# Patient Record
Sex: FEMALE | Race: WHITE
[De-identification: names, ages, dates, MRNs, and addresses within clinical notes are randomized per-mention and may not be internally consistent; named-entity substitution may affect disease eponyms.]

---

## 2021-02-03 ENCOUNTER — HOSPITAL ENCOUNTER (OUTPATIENT)
Dept: HOSPITAL 53 - M PLAIMG | Age: 21
End: 2021-02-03
Attending: NURSE PRACTITIONER
Payer: COMMERCIAL

## 2021-02-03 DIAGNOSIS — R09.89: Primary | ICD-10-CM

## 2021-02-03 NOTE — REPPI
INDICATION:

R09.89 CHEST CONGESTION.



COMPARISON:

No comparison chest x-ray.



TECHNIQUE:

Three views presented...



FINDINGS:

The lungs are well inflated and free of infiltrate.  The pleural angles are sharp.

The heart size is normal.  Pulmonary vasculature is not increased.  No significant

bony abnormality is seen.



IMPRESSION:

Negative chest x-ray.





<Electronically signed by Adi Baca > 02/03/21 [id number]

## 2021-02-05 ENCOUNTER — HOSPITAL ENCOUNTER (OUTPATIENT)
Dept: HOSPITAL 53 - M SFHCWAGY | Age: 21
End: 2021-02-05
Attending: NURSE PRACTITIONER
Payer: COMMERCIAL

## 2021-02-05 DIAGNOSIS — Z11.3: Primary | ICD-10-CM

## 2021-02-05 LAB
CHLAMYDIA DNA AMPLIFICATION: (no result)
N GONORRHOEA RRNA SPEC QL NAA+PROBE: (no result)

## 2021-02-05 PROCEDURE — 87624 HPV HI-RISK TYP POOLED RSLT: CPT

## 2021-02-05 PROCEDURE — 87661 TRICHOMONAS VAGINALIS AMPLIF: CPT

## 2021-02-10 ENCOUNTER — HOSPITAL ENCOUNTER (OUTPATIENT)
Dept: HOSPITAL 53 - M PLALAB | Age: 21
End: 2021-02-10
Attending: NURSE PRACTITIONER
Payer: COMMERCIAL

## 2021-02-10 DIAGNOSIS — J30.2: Primary | ICD-10-CM

## 2021-02-10 DIAGNOSIS — H10.45: ICD-10-CM

## 2021-02-10 LAB
C3 SERPL-MCNC: 116 MG/DL (ref 90–180)
C4 SERPL-MCNC: 22 MG/DL (ref 10–40)
IGA SERPL-MCNC: 144 MG/DL (ref 70–400)
IGE SERPL-ACNC: 762 IU/ML (ref ?–100)
IGG SERPL-MCNC: 971 MG/DL (ref 681–1648)
IGM SERPL-MCNC: 188 MG/DL (ref 40–230)

## 2021-02-26 ENCOUNTER — HOSPITAL ENCOUNTER (EMERGENCY)
Dept: HOSPITAL 53 - M ED | Age: 21
Discharge: HOME | End: 2021-02-26
Payer: COMMERCIAL

## 2021-02-26 VITALS — BODY MASS INDEX: 29.31 KG/M2 | HEIGHT: 65 IN | WEIGHT: 175.93 LBS

## 2021-02-26 VITALS — DIASTOLIC BLOOD PRESSURE: 87 MMHG | SYSTOLIC BLOOD PRESSURE: 128 MMHG

## 2021-02-26 DIAGNOSIS — O99.011: ICD-10-CM

## 2021-02-26 DIAGNOSIS — O34.61: Primary | ICD-10-CM

## 2021-02-26 DIAGNOSIS — O99.331: ICD-10-CM

## 2021-02-26 DIAGNOSIS — O03.9: ICD-10-CM

## 2021-02-26 DIAGNOSIS — Z3A.01: ICD-10-CM

## 2021-02-26 LAB
ALBUMIN SERPL BCG-MCNC: 3.9 GM/DL (ref 3.2–5.2)
ALT SERPL W P-5'-P-CCNC: 20 U/L (ref 12–78)
APTT BLD: 24.1 SECONDS (ref 24.2–38.5)
B-HCG SERPL-ACNC: (no result) MIU/ML
BASOPHILS # BLD AUTO: 0.1 10^3/UL (ref 0–0.2)
BASOPHILS NFR BLD AUTO: 0.5 % (ref 0–1)
BILIRUB CONJ SERPL-MCNC: 0.2 MG/DL (ref 0–0.2)
BILIRUB SERPL-MCNC: 0.7 MG/DL (ref 0.2–1)
BUN SERPL-MCNC: 6 MG/DL (ref 7–18)
CALCIUM SERPL-MCNC: 9.2 MG/DL (ref 8.5–10.1)
CHLORIDE SERPL-SCNC: 104 MEQ/L (ref 98–107)
CO2 SERPL-SCNC: 24 MEQ/L (ref 21–32)
CREAT SERPL-MCNC: 0.91 MG/DL (ref 0.55–1.3)
EOSINOPHIL # BLD AUTO: 0.4 10^3/UL (ref 0–0.5)
EOSINOPHIL NFR BLD AUTO: 3.1 % (ref 0–3)
GFR SERPL CREATININE-BSD FRML MDRD: > 60 ML/MIN/{1.73_M2} (ref 60–?)
GLUCOSE SERPL-MCNC: 103 MG/DL (ref 70–100)
HCT VFR BLD AUTO: 30.8 % (ref 36–47)
HCT VFR BLD AUTO: 37.7 % (ref 36–47)
HGB BLD-MCNC: 10.6 G/DL (ref 12–15.5)
HGB BLD-MCNC: 12.7 G/DL (ref 12–15.5)
INR PPP: 1.02
LIPASE SERPL-CCNC: 82 U/L (ref 73–393)
LYMPHOCYTES # BLD AUTO: 3.1 10^3/UL (ref 1.5–5)
LYMPHOCYTES NFR BLD AUTO: 27.2 % (ref 24–44)
MCH RBC QN AUTO: 30.8 PG (ref 27–33)
MCH RBC QN AUTO: 32.1 PG (ref 27–33)
MCHC RBC AUTO-ENTMCNC: 33.7 G/DL (ref 32–36.5)
MCHC RBC AUTO-ENTMCNC: 34.4 G/DL (ref 32–36.5)
MCV RBC AUTO: 91.5 FL (ref 80–96)
MCV RBC AUTO: 93.3 FL (ref 80–96)
MONOCYTES # BLD AUTO: 0.9 10^3/UL (ref 0–0.8)
MONOCYTES NFR BLD AUTO: 7.4 % (ref 2–8)
NEUTROPHILS # BLD AUTO: 7.1 10^3/UL (ref 1.5–8.5)
NEUTROPHILS NFR BLD AUTO: 61.5 % (ref 36–66)
PLATELET # BLD AUTO: 220 10^3/UL (ref 150–450)
PLATELET # BLD AUTO: 310 10^3/UL (ref 150–450)
POTASSIUM SERPL-SCNC: 3.8 MEQ/L (ref 3.5–5.1)
PROT SERPL-MCNC: 7 GM/DL (ref 6.4–8.2)
PROTHROMBIN TIME: 13.6 SECONDS (ref 12.5–14.3)
RBC # BLD AUTO: 3.3 10^6/UL (ref 4–5.4)
RBC # BLD AUTO: 4.12 10^6/UL (ref 4–5.4)
SODIUM SERPL-SCNC: 137 MEQ/L (ref 136–145)
WBC # BLD AUTO: 11.5 10^3/UL (ref 4–10)
WBC # BLD AUTO: 8.5 10^3/UL (ref 4–10)

## 2021-02-26 NOTE — CCD
Summarization Of Episode

                             Created on: 2021



FLORIDA ROTHMAN

External Reference #: [id number]

: 2000

Sex: Female



Demographics





                          Address                   [address] [place]  [address]

 

                          Home Phone                [phone]

 

                          Preferred Language        English

 

                          Marital Status            

 

                          Roman Catholic Affiliation     NONE

 

                          Race                      White

 

                          Ethnic Group              Not  or 





Author





                          Author                    [facility]

 

                          Organization              [facility]

 

                          Address                   Unknown

 

                          Phone                     Unavailable







Support





                Name            Relationship    Address         Phone

 

                    JOSSUE ROTHMAN  Next Of Kin         [address] [place]  [address]               [phone]

 

                    MILAGROS PELLETIER     Next Of Kin         [address] [place] [address]                     [phone]

 

                    GUILFOYLE           Next Of Kin         [address] [place] [address]                    [phone]

 

                    MILAGROS WHITNEY     Next Of Kin         [address] [place] [address]                     [phone]

 

                    [employer]      Next Of Kin         [address] [place] [address]                    [phone]

 

                UE              Next Of Kin     Unknown         Unavailable

 

                    TEODORO DEL VALLE    Next Of Kin         [address] [place] [address]                    [phone]

 

                    jossue rothman  ECON                [address] [place]  [address]               Unavailable

 

                    MILAGROS WHITNEY     ECON                [address] [address]  [address]                     Unavailable







Care Team Providers





                    Care Team Member Name Role                Phone

 

                    JOHAN CATES    Unavailable         Unavailable

 

                    JOAHN CATES    Unavailable         Unavailable

 

                    DESJARLAIS,  RANJAN NP Unavailable         Unavailable

 

                    DESJARLAIS,  RANJAN NP Unavailable         Unavailable

 

                    DESJARLAIS,  RANJAN NP Unavailable         Unavailable

 

                    DESJARLAIS,  RANJAN NP Unavailable         Unavailable

 

                    DESJARLAIS,  RANJAN NP Unavailable         Unavailable

 

                    DESJARLAIS,  RANJAN NP Unavailable         Unavailable

 

                    DESJARLAIS,  RANJAN NP Unavailable         Unavailable

 

                    DESJARLAIS,  RANJAN NP Unavailable         Unavailable

 

                    DESJARLAIS,  RANJAN NP Unavailable         Unavailable

 

                    LETTIERE, A SAMANTHA PA Unavailable         Unavailable

 

                    LETTIERE, A SAMANTHA PA Unavailable         Unavailable

 

                    LETTIERE, A SAMANTHA PA Unavailable         Unavailable

 

                    LETTIERE, A SAMANTHA PA Unavailable         Unavailable

 

                    LETTIERE, A SAMANTHA PA Unavailable         Unavailable

 

                    LETTIERE, A SAMANTHA PA Unavailable         Unavailable

 

                    LETTIERE, A SAMANTHA PA Unavailable         Unavailable

 

                    LETTIERE, A SAMANTHA PA Unavailable         Unavailable

 

                    LETTIERE, A SAMANTHA PA Unavailable         Unavailable

 

                    LETTIERE, A SAMANTHA PA Unavailable         Unavailable

 

                    LETTIERE, A SAMANTHA PA Unavailable         Unavailable

 

                    LETTIERE, A SAMANTHA PA Unavailable         Unavailable

 

                    LETTIERE, A SAMANTHA PA Unavailable         Unavailable

 

                    LETTIERE, A SAMANTHA PA Unavailable         Unavailable

 

                    LETTIERE, A SAMANTHA PA Unavailable         Unavailable

 

                    LETTIERE, A SAMANTHA PA Unavailable         Unavailable

 

                    LETTIERE, A SAMANTHA PA Unavailable         Unavailable

 

                    LETTIERE, A SAMANTHA PA Unavailable         Unavailable

 

                    LETTIERE, A SAMANTHA PA Unavailable         Unavailable

 

                    LETTIERE, A SAMANTHA PA Unavailable         Unavailable

 

                    LETTIERE, A SAMANTHA PA Unavailable         Unavailable

 

                    LETTIERE, A SAMANTHA PA Unavailable         Unavailable

 

                    LETTIERE, A SAMANTHA PA Unavailable         Unavailable

 

                    LETTIERE, A SAMANTHA PA Unavailable         Unavailable

 

                    LETTIERE, A SAMANTHA PA Unavailable         Unavailable

 

                    LETTIERE, A SAMANTHA PA Unavailable         Unavailable

 

                    LETTIERE, A SAMANTHA PA Unavailable         Unavailable

 

                    LETTIERE, A SAMANTHA PA Unavailable         Unavailable

 

                    LETTIERE, A SAMANTHA PA Unavailable         Unavailable



                                  



Re-disclosure Warning

          The records that you are about to access may contain information from 
federally-assisted alcohol or drug abuse programs. If such information is 
present, then the following federally mandated warning applies: This information
has been disclosed to you from records protected by federal confidentiality 
rules (42 CFR part 2). The federal rules prohibit you from making any further 
disclosure of this information unless further disclosure is expressly permitted 
by the written consent of the person to whom it pertains or as otherwise 
permitted by 42 CFR part 2. A general authorization for the release of medical 
or other information is NOT sufficient for this purpose. The Federal rules 
restrict any use of the information to criminally investigate or prosecute any 
alcohol or drug abuse patient.The records that you are about to access may 
contain highly sensitive health information, the redisclosure of which is 
protected by Article 27-F of the [facility] Public Health law. If you 
continue you may have access to information: Regarding HIV / AIDS; Provided by 
facilities licensed or operated by the [facility] Office of Mental Health; 
or Provided by the [facility] Office for People With Developmental 
Disabilities. If such information is present, then the following [place] 
mandated warning applies: This information has been disclosed to you from 
confidential records which are protected by state law. State law prohibits you 
from making any further disclosure of this information without the specific 
written consent of the person to whom it pertains, or as otherwise permitted by 
law. Any unauthorized further disclosure in violation of state law may result in
a fine or retirement sentence or both. A general authorization for the release of 
medical or other information is NOT sufficient authorization for further disc
losure.                                                                         
    



Family History

          



             Family Member Name Family Member Gender Family Member Status Date o

f Status 

Description                             Data Source(s)

 

           Unknown    Unknown    Problem                          [facility] ([facility] )



                                                                                
       



Encounters

          



           Encounter  Providers  Location   Date       Indications Data Source(s

)

 

                Outpatient                      [address] [facility]

[place] [id number] 2021 12:00:00 AM

EST                                                 eCW1 ([facility])

 

                Outpatient                      [address] [facility]

[place] [id number] 2021 12:00:00 AM

EST                                                 eCW1 ([facility])

 

                Unknown                         [address] [facility]

[place] [id number] 2021 12:00:00 AM 

EST                                                 eCW1 ([facility])

 

                Outpatient                      [address] [facility] [id number] 2021 12:00:00 AM

EST                                                 eCW1 ([facility])

 

                Outpatient      Attender: SAMANTHA loera 08/10/2020 

09:40:00 AM [facility] ([facility]

, [facility])

 

           Outpatient Attender: RANJAN PADILLA NP            2020 01:

00:00 PM [facility]

 

           Outpatient Attender: RANJAN PADILLA NP            2020 09:

02:00 AM [facility]

 

           Outpatient Attender: JOSSUE CATES            2020 02:00:00 PM 

[facility]

 

           Outpatient Attender: JOSSUE CATES            05/15/2020 09:00:00 AM 

[facility]

 

           Outpatient Attender: RANJAN PADILLA NP            2020 11:

40:00 AM [facility]

 

           Outpatient Attender: JOSSUE CATES            2020 09:01:00 AM 

[facility]

 

           Outpatient Attender: JOSSUE CATES            2020 09:41:00 AM 

[facility]

 

           Outpatient Attender: RANJAN PADILLA NP            2020 11:

40:00 AM [facility]

 

           Outpatient Attender: RANJAN PADILLA NP            2020 11:

39:00 AM [facility]

 

           Outpatient Attender: JOSSUE CATES            2020 12:44:00 PM 

[facility]

 

           Outpatient Attender: JOSSUE CATES            2020 12:43:00 PM 

[facility]

 

           Outpatient Attender: JOSSUE CATES            2020 09:57:00 AM 

[facility]

 

           Outpatient Attender: RANJAN PADILLA NP            2019 02:

31:00 PM [facility]

 

           Outpatient Attender: RANJAN PADILLA NP            2019 09:

06:00 AM [facility]

 

           Outpatient Attender: RANJAN PADILLA NP            2019 12:

47:00 PM [facility]



                                                                                
                                                                                
                                                                                
                                   



Medications

          



          Medication Brand Name Start Date Product Form Dose      Route     Admi

nistrative 

Instructions Pharmacy Instructions Status     Indications Reaction   Description

 Data 

Source(s)

 

        GIAN 3-0.02 MG GIAN 3-0.02 MG 2021 12:00:00 AM EST         1.0 {tabl

et}                         

active                                          GIAN 3-0.02 MG   eCW1 ([facility])

 

        GIAN 3-0.02 MG GIAN 3-0.02 MG 2021 12:00:00 AM EST         1.0 {tabl

et}                         

active                                          GIAN 3-0.02 MG   eCW1 ([facility])

 

                                        Amoxicillin 875 MG / Clavulanate 125 MG 

Oral Tablet Amoxicillin-Pot Clavulanate 

875-125 MG      Amoxicillin-Pot Clavulanate 875-125 MG 2021 12:00:00 AM ES

T                 

1.0 {tablet}                         suspended                 Amoxicillin-Pot C

lavulanate 875-125 MG eCW1 

([facility])

 

                                        Amoxicillin 875 MG / Clavulanate 125 MG 

Oral Tablet Amoxicillin-Pot Clavulanate 

875-125 MG      Amoxicillin-Pot Clavulanate 875-125 MG 2021 12:00:00 AM ES

T                 

1.0 {tablet}                         suspended                 Amoxicillin-Pot C

lavulanate 875-125 MG eCW1 

([facility])

 

                                        Amoxicillin 875 MG / Clavulanate 125 MG 

Oral Tablet Amoxicillin-Pot Clavulanate 

875-125 MG      Amoxicillin-Pot Clavulanate 875-125 MG 2021 12:00:00 AM ES

T                 

1.0 {tablet}                         active                  Amoxicillin-Pot Cla

vulanate 875-125 MG eCW1 

([facility])

 

     Birth Control Pill      08/10/2020 12:00:00 AM EDT                         

 active                [facility] 

([facility])



                                                                                
                                     



Insurance Providers

          



             Payer name   Policy type / Coverage type Policy ID    Covered party

 ID Covered 

party's relationship to macias Policy Macias             Plan Information

 

          [employer] [place] 306/806           [id number]           SP        

          [id number]

 

          SELF PAY ONLY           [id number]           SP                  [id number]

[id number]

 

          [employer] B         [id number]           S                   [id number]

[id number]

 

           [place]           [id number]           SPO             

    [id number]

 

          [employer]  [place]         [id number]           O               

    [id number]

 

           [facility] HUMANA            [id number]           [id number]       

         [id number]

 

             U         [id number]           Self                [id number]

 

            [employer] DOUGLAS O         [id number]           S              

     [id number]

 

            [facility]           [id number]           [id number]         

       [id number]

 

            [facility]           [id number]           [id number]          

       [id number]

 

             [facility] Health Maintenance Organization (HMO) [id number]

[id number]                 

Family Dependent                                    [id number]

 

            [facility]           [id number]           [id number]          

       [id number]

 

          [facility]           [id number]           SP                  [id number]

[id number]



                                                                                
                                                                                
                                              



Problems, Conditions, and Diagnoses

          



           Code       Display Name Description Problem Type Effective Dates Data

 Source(s)

 

           J32.9      Sinusitis  Sinusitis  Problem    2021 12:00:00 AM ES

T eCW1 ([facility])

 

           J30.9      Allergic rhinitis Allergic rhinitis Problem    2021 

12:00:00 AM EST 

eCW1 ([facility])

 

             G47.00       Insomnia, unspecified INSOMNIA, UNSPECIFIED Diagnosis 

   2020 01:00:00

PM [facility]

 

             F60.3        Borderline personality disorder BORDERLINE PERSONALITY

 DISORDER Diagnosis    

2020 01:00:00 PM [facility]

 

             F41.1        Generalized anxiety disorder GENERALIZED ANXIETY DISOR

MERRY Diagnosis    

2020 01:00:00 PM [facility]

 

                F60.9           Personality disorder, unspecified PERSONALITY DI

SORDER, UNSPECIFIED 

Diagnosis                 2020 09:02:00 AM [facility]

 

                    F41.0               Panic disorder [episodic paroxysmal anxi

ety] PANIC DISORDER [EPISODIC 

PAROXYSMAL ANXIETY] Diagnosis           2020 09:02:00 AM [facility]

l

 

                    F33.2               Major depressive disorder, recurrent sev

ere without psychotic features 

MAJOR DEPRESSV DISORDER, RECURRENT SEVERE W/O PSYC Diagnosis                 2020 02:00:00

PM [facility]



                                                                                
                                                                                
      



Surgeries/Procedures

          



             Procedure    Description  Date         Indications  Data Source(s)

 

                    Therapeutic, Prophylactic Or Diagnostic Injection Subq/Im   

                  08/10/2020 12:00:00 

AM EDT                                              [facility] ([facility]



                                                                                
       



Results

          



                    ID                  Date                Data Source

 

                    CBC with Differential 2021 12:00:00 AM EST eCW1 ([facility])









          Name      Value     Range     Interpretation Code Description Data Marialuisa

rce(s) Supporting 

Document(s)

 

                    4.45      4.00-5.40                     eCW1 ([facility])  

 

                    7.6       4.0-10.0                      eCW1 ([facility])  

 

                    13.6      12.0-15.5                     eCW1 ([facility])  

 

                    42.7      36.0-47.0                     eCW1 ([facility])  

 

                    31.9      32.0-36.5                     eCW1 ([facility])  

 

                    96.0      80.0-96.0                     eCW1 ([facility])  

 

                    30.6      27.0-33.0                     eCW1 ([facility])  

 

                    6.7       0.0-5.0                       eCW1 ([facility])  

 

                    293       150-450                       eCW1 ([facility])  

 

                    12.3      11.5-14.5                     eCW1 ([facility])  

 

                    46.7      36.0-66.0                     eCW1 ([facility])  

 

                    33.9      24.0-44.0                     eCW1 ([facility])  

 

                    0.5       0.0-0.8                       eCW1 ([facility])  

 

                    0.5       0.0-1.0                       eCW1 ([facility])  

 

                    3.6       1.5-8.5                       eCW1 ([facility])  

 

                    12.1      0.0-3.0                       eCW1 ([facility])  

 

                    2.6       1.5-5.0                       eCW1 ([facility])  

 

                    0.9       0.0-0.5                       eCW1 ([facility])  

 

                    0.0       0.0-0.2                       eCW1 ([facility])  









                    ID                  Date                Data Source

 

                    Comprehensive Metabolic Profile (CMP) 2021 12:00:00 AM

 EST eCW1 ([facility])









          Name      Value     Range     Interpretation Code Description Data Marialuisa

rce(s) Supporting 

Document(s)

 

                    9         7-18                          eCW1 ([facility])  

 

                    0.74      0.55-1.30                     eCW1 ([facility])  

 

                    80                                eCW1 ([facility])  

 

                    139       136-145                       eCW1 ([facility])  

 

                    106                               eCW1 ([facility])  

 

                    > 60.0    >60                           eCW1 ([facility])  

 

                    4.5       3.5-5.1                       eCW1 ([facility])  

 

                    29        21-32                         eCW1 ([facility])  

 

                    27        12-78                         eCW1 ([facility])  

 

                    74                                eCW1 ([facility])  

 

                    9.0       8.5-10.1                      eCW1 ([facility])  

 

                    0.4       0.2-1.0                       eCW1 ([facility])  

 

                    23        7-37                          eCW1 ([facility])  

 

                    7.0       6.4-8.2                       eCW1 ([facility])  

 

                    3.7       3.2-5.2                       eCW1 ([facility])  

 

                    1.1       1.2-2.2                       eCW1 ([facility])  









                    ID                  Date                Data Source

 

                    FREE T4 & TSH PANEL 2021 12:00:00 AM EST eCW1 ([facility])









          Name      Value     Range     Interpretation Code Description Data Marialuisa

rce(s) Supporting 

Document(s)

 

                    1.480     0.358-3.740                     eCW1 ([facility])  

 

                    0.99      0.76-1.46                     eCW1 ([facility])  









                    ID                  Date                Data Source

 

                    LIPID PANEL (CARDIAC RISK) 2021 12:00:00 AM EST eCW1 (

[facility])









          Name      Value     Range     Interpretation Code Description Data Marialuisa

rce(s) Supporting 

Document(s)

 

           Cholesterol in HDL [Moles/volume] in Serum or Plasma 64         >40  

                            eCW1 ([facility])                    

 

           Triglyceride [Mass/volume] in Serum or Plasma by calculation 143     

   <150                             eCW1 

([facility])         

 

           Cholesterol [Moles/volume] in Serum or Plasma 227        <200        

                     eCW1 ([facility])                    

 

                    3.546     <5                            eCW1 ([facility])  

 

           Cholesterol in LDL [Mass/volume] in Serum or Plasma by calculation 13

4        <100                             

eCW1 ([facility])    

 

                    163                                     eCW1 ([facility])  









                    ID                  Date                Data Source

 

                    [id number]          08/10/2020 12:00:00 AM EDT [facility]

 

                                        

________________________________________________________________________________

NEHEMIAH Albrecht         Patient Name: ABRAM KANGY45Leighton Marino [employer]   
              [facility] 2000[facility]  [address]              Date of
 Exam: 08/10/2020PH#: [phone]: 
[phone]______________________________________________________________________

__________EXAM: LUMBSACRAL SPINE (2 OR 3 VIEWS) XRAYCLINICAL INFORMATION:  Back 
pain.Five views.Multiple views of the lumbosacral spine show no acute 
fracture,dislocation, or subluxation.  The intervertebral disc spaces are 
symmetricand well maintained.  There is no spondylolysis or spondylolisthesis.  
Thepedicles are intact bilaterally and there is no destructive osseous lesion. 
IMPRESSION:Essentially unremarkable lumbosacral spine series.PJ Ayoub/Pratik you for referring FLORIDA KANG to our office.          
Electronically Signed - YANY CLEMONS DO  20 14:23   









          Name      Value     Range     Interpretation Code Description Data Marialuisa

rce(s) Supporting 

Document(s)

 

                                                                       









                    ID                  Date                Data Source

 

                    [id number]          08/10/2020 12:00:00 AM EDT [facility]

 

                                        

________________________________________________________________________________

NEHEMIAH Albrecht         Patient Name: ABRAM KANGY[address]   
              YOB: 2000[place]  [address]              Date of
 Exam: 08/10/2020PH#: [phone]: 
[phone]______________________________________________________________________

__________EXAM: KNEE RIGHT (COMPLETE)  X-RAYCLINICAL INFORMATION: Pain.Five 
views.The compartments are symmetric and well maintained.  There is no 
acutefracture or destructive osseous lesion.Yany Clemons, PJ/Ladarius you
 for referring FLORIDA KANG to our office.          Electronically Signed -
 YANY CLEMONS DO  20 14:23   









          Name      Value     Range     Interpretation Code Description Data Marialuisa

rce(s) Supporting 

Document(s)

 

                                                                       









                    ID                  Date                Data Source

 

                    [id number]           2020 12:00:00 AM EDT [facility]









          Name      Value     Range     Interpretation Code Description Data Marialuisa

rce(s) Supporting 

Document(s)

 

          2019-nCoV RNA XXX BRAVO+probe-Imp                                       

  [facility]     

 

                                        This lab was ordered by [facility] and reported by 

[employer]. 







                                        Procedure

 

                                          



                                                                                
                                                                              



Social History

          



           Code       Duration   Value      Status     Description Data Source(s

)

 

           Smoking    2021 12:00:00 AM EST Never Smoker completed  Never S

moker eCW1 

([facility])

 

           Smoking    2021 12:00:00 AM EST Never Smoker completed  Never S

moker eCW1 

([facility])

 

           Smoking    2021 12:00:00 AM EST Never Smoker completed  Never S

moker eCW1 

([facility])

 

           Smoking    2021 12:00:00 AM EST Never Smoker completed  Never S

moker eCW1 

([facility])

 

             Smoking      08/10/2020 12:00:00 AM EDT Patient has never smoked co

mpleted    Patient 

has never smoked                        [facility] ([facility])



                                                                                
                                                          



Vital Signs

          



                    ID                  Date                Data Source

 

                    UNK                                      









           Name       Value      Range      Interpretation Code Description Data

 Source(s)

 

           Diastolic blood pressure 79 mm[Hg]                        79 mm[Hg]  

eCW1 ([facility])

 

           Systolic blood pressure 120 mm[Hg]                       120 mm[Hg] e

CW1 ([facility])

 

           Body mass index (BMI) [Ratio] 27.29 kg/m2                       27.29

 kg/m2 W1 ([facility])

 

           Body height 65 [in_i]                        65 [in_i]  eCW1 ([facility])

 

           Body weight 74.39 kg                         74.39 kg   eCW1 ([facility])

 

           Body weight 164 [lb_av]                       164 [lb_av] eCW1 ([facility])

 

           Diastolic blood pressure 74 mm[Hg]                        74 mm[Hg]  

eCW1 ([facility])

 

           Systolic blood pressure 132 mm[Hg]                       132 mm[Hg] e

CW1 ([facility])

 

           Body temperature 98.8 [degF]                       98.8 [degF] eCW1 (

[facility])

 

           Respiratory rate 16 /min                          16 /min    eCW1 ([facility])

 

           Heart rate 94 /min                          94 /min    eCW1 ([facility])

 

           Body mass index (BMI) [Ratio] 27.45 kg/m2                       27.45

 kg/m2 eCW1 ([facility])

 

           Body height 65 [in_i]                        65 [in_i]  eCW1 ([facility])

 

           Body weight 165 [lb_av]                       165 [lb_av] eCW1 ([facility])

 

           Body surface area Derived from formula 1.79 m2                       

   1.79 m2    [facility] ([facility], )

 

           Body weight 71.669 kg                        71.669 kg  [facility] ([facility], )

 

           Ideal body weight 125 [lb_av]                       125 [lb_av] [facility]

T ([facility], )

 

           Body mass index (BMI) [Ratio] 26.3 kg/m2                       26.3 k

g/m2 [facility] ([facility], )

 

           Body weight 158.00 [lb_av]                       158.00 [lb_av] [facility]

T ([facility], )

 

           Body height 65 [in_i]                        65 [in_i]  [facility]

 

                                        5'5" 

 

           Body weight 170 [lb_av]                       170 [lb_av] eCW1 ([facility])

 

           Body height 65 [in_i]                        65 [in_i]  eCW1 ([facility])

 

           Body mass index (BMI) [Ratio] 28.29 kg/m2                       28.29

 kg/m2 W1 ([facility])

 

           Heart rate 88 /min                          88 /min    eCW1 ([facility])

 

           Respiratory rate 18 /min                          18 /min    eCW1 ([facility])

 

           Body temperature 98.4 [degF]                       98.4 [degF] eCW1 (

[facility])

 

           Systolic blood pressure 124 mm[Hg]                       124 mm[Hg] e

CW1 ([facility])

 

           Diastolic blood pressure 78 mm[Hg]                        78 mm[Hg]  

eCW1 ([facility])

 

           Body mass index (BMI) [Ratio] 26.6 kg/m2                       26.6 k

g/m2 [facility] ([facility])

 

           Body height 65 [in_i]                        65 [in_i]  [facility] ([facility])

 

                                        5'5" 

 

           Body weight 160.00 [lb_av]                       160.00 [lb_av] [facility]

T ([facility], 

[facility])

 

           Body temperature 98.5 [degF]                       98.5 [degF] [facility]

 ([facility], 

[facility])

 

           Oxygen saturation in Arterial blood by Pulse oximetry 98 %           

                  98 %       [facility] 

([facility])

 

           Respiratory rate 12 /min                          12 /min    [facility] (

[facility])

 

           Heart rate 76 /min                          76 /min    [facility] ([facility])

 

           Diastolic blood pressure 96 mm[Hg]                        96 mm[Hg]  

[facility] ([facility] 

[facility])

 

           Systolic blood pressure 136 mm[Hg]                       136 mm[Hg] M

[facility] ([facility],

 [facility])

 

           Body surface area Derived from formula 1.78 m2                       

   1.78 m2    [facility] ([facility])

 

           Body weight 70.308 kg                        70.308 kg  [facility] ([facility])

 

           Ideal body weight 125 [lb_av]                       125 [lb_av] [facility]

T ([facility])

 

           Body mass index (BMI) [Ratio] 25.8 kg/m2                       25.8 k

g/m2 [facility] ([facility])

 

           Body weight 155.00 [lb_av]                       155.00 [lb_av] [facility]

T ([facility])

 

           Body height 65 [in_i]                        65 [in_i]  [facility] ([facility])

 

                                        5'5" 



                                                                                
                  



Patient Treatment Plan of Care

          



             Planned Activity Planned Date Details      Description  Data Source

(s)

 

             GIAN 3-0.02 MG 2021 12:00:00 AM EST                           

eCW1 ([facility])

 

             GIAN 3-0.02 MG 2021 12:00:00 AM EST                           

eCW1 ([facility])

 

                    Amoxicillin 875 MG / Clavulanate 125 MG Oral Tablet 20 12:00:00 AM EST  

                                                    eCW1 ([facility])

## 2021-02-26 NOTE — CCD
Summarization of Episode Note

                             Created on: 2021



FLORIDA KANG

External Reference #: [id number]

: 2000

Sex: Female



Demographics





                          Address                   [address] [place]  [address]

 

                          Home Phone                [phone]

 

                          Preferred Language        Unknown

 

                          Marital Status            Unknown

 

                          Jehovah's witness Affiliation     Unknown

 

                          Race                      White

 

                          Ethnic Group              Unknown





Author





                          Author                    [facility]

ems

 

                          Organization              [facility]

ems

 

                          Address                   Unknown

 

                          Phone                     Unavailable







Support





                Name            Relationship    Address         Phone

 

                    FLORIDA KANG  GUAR                [address]

[place], [place]  [address] [phone]

 

                    mcjossue aleman   [employer]                [address] [place]  [address]               [phone]







Care Team Providers





                    Care Team Member Name Role                Phone

 

                    Queta Stanley Unavailable         [phone]







PROBLEMS





          Type      Condition ICD9-CM Code ZEO31-IX Code Onset Dates Condition S

tatus SNOMED 

Code                                    Notes

 

        Problem Dyspareunia in female         N94.10          Active  49345131  

 

        Problem Allergic rhinitis         J30.9           Active  27572176  

 

        Problem Other chronic pain         G89.29          Active  [id number]  







ALLERGIES

No Known Allergies



ENCOUNTERS from 1998 to 2021





             Encounter    Location     Date         Provider     Diagnosis

 

                [address] [address]     Queta Stanley                               Allergic rhinitis J30.9 ; Uses oral cont

raceptives Z30.41 ; Former 

smoker Z87.891 ; Cervical cancer screening Z12.4 ; Screening for metabolic 
disorder Z13.228 ; Screening for lipid disorders Z13.220 and Encounter to 
establish care Z76.89







IMMUNIZATIONS





                Vaccine         Route           Administration Date Status

 

                Influenza (6mo & up) Fluzone Unknown         Aug 29, 2017    Oth

ers







SOCIAL HISTORY

Tobacco Use:



                    Social History Observation Description         Date

 

                    Details (start date - stop date) Never Smoker         



Sex Assigned At Birth:



                          Social History Observation Description

 

                          Sex Assigned At Birth     Unknown



Education:



                    Question            Answer              Notes

 

                    Level of Education: Not Finished College  



Language:



                    Question            Answer              Notes

 

                    Languages spoken:   English              



Taoism:



                    Question            Answer              Notes

 

                    Taoism            [address]         



Sexual Hx:



                    Question            Answer              Notes

 

                    Had sex in the last 12 months (vaginal, oral, or anal)? No  

                 

 

                    LMP:                2020              



Alcohol Screening:



                    Question            Answer              Notes

 

                    Did you have a drink containing alcohol in the past year? Ye

s                  

 

                    Points              1                    

 

                    Interpretation      Negative             

 

                                        How many drinks did you have on a typica

l day when you were drinking in the past

year?                     1 or 2 (0 points)          

 

                          How often did you have a drink containing alcohol in t

he past year? Monthly or 

less (1 point)                           



Tobacco Use:



                    Question            Answer              Notes

 

                    Are you a:          current every day smoker 1/2 ppd

 

                    Are you a:          never smoker         







REASON FOR REFERRAL

No Information



VITAL SIGNS





                    Weight              170 lbs             

 

                    Height              65 in               

 

                    BMI                 28.29 kg/m2         

 

                    Heart Rate          88 /min             

 

                    Respiratory Rate    18 /min             

 

                    Temperature         98.4 degrees Fahrenheit 

 

                    Oximetry            98                  

 

                    Blood pressure systolic 124 mm Hg           

 

                    Blood pressure diastolic 78 mm Hg            







MEDICATIONS





           Medication SIG (Take, Route, Frequency, Duration) Notes      Start Da

te End Date   

Status

 

           Larissia 0.1-20 MG-MCG 1 tablet Orally Once a day for 28 day(s)      

                            Active

 

           Allegra-D 24 Hour                                             Active







PROCEDURES

No Information



RESULTS





REASON FOR VISIT

to establish



MEDICAL (GENERAL) HISTORY





                    Type                Description         Date

 

                    Medical History     seasonal allergies   

 

                    Surgical History    3 tympanic surgeries on left ear  







Goals Section

No Information



Health Concerns

No Information



MEDICAL EQUIPMENT

No Information



MENTAL STATUS

No Information



FUNCTIONAL STATUS

No Information



ASSESSMENTS





             Encounter Date Diagnosis    Assessment Notes Treatment Notes Treatm

ent Clinical 

Notes

 

                    Allergic rhinitis (ICD-10 - J30.9)              

   



                                        



referral to allergist

 

                    Uses oral contraceptives (ICD-10 - Z30.41)      

           



                                        



establishing with [facility]



will defer care

 

                    Former smoker (ICD-10 - Z87.891)                

 



                                        



advised to staff off of it

 

                    Cervical cancer screening (ICD-10 - Z12.4)      

           



                                        



had no prior

 

                    Screening for metabolic disorder (ICD-10 - Z13.2

28)                 



                                        



screening

 

                    Screening for lipid disorders (ICD-10 - Z13.220)

                 



                                        



screening

 

                    Encounter to establish care (ICD-10 - Z76.89)   

              



                                        



establishing care







PLAN OF TREATMENT

Medication



                Medication Name Sig             Start Date      Stop Date

 

                Larissia 0.1-20 MG-MCG 1 tablet Orally Once a day for 28 day(s) 

                 

 

                Allegra-D 24 Hour                                  



Treatment Notes



                    Assessment          Notes               Clinical Notes

 

                    Allergic rhinitis                       referral to allergis

t

 

                    Uses oral contraceptives                     [facility]

 

                    Former smoker                           advised to staff off

 of it

 

                    Cervical cancer screening                     had no prior

 

                    Screening for metabolic disorder                     screeni

ng

 

                    Screening for lipid disorders                     screening

 

                    Encounter to establish care                     establishing

 care



Next Appt



                                        Details

 

                                        1 Year Reason:

 

                                        Provider Name:Lilly Stone, 2021 

10:00:00 AM, [facility] [place], [address], [phone]

 

                                        Provider Name:Queta Stanley,  11:00:00 AM, [address] [place], [address], [phone]







Insurance Providers





             Payer Name   Payer Address Payer Phone  Insured Name Patient Relati

onship to 

Insured                   Coverage Start Date       Coverage End Date

 

                    [employer] [place] [id number] [facility] [address] 

[phone]    FLORIDA KANG self

## 2021-02-26 NOTE — CCD
Summarization of Episode Note

                             Created on: 2021



EMIL CANADAIDY ESTEE

External Reference #: [id number]

: 2000

Sex: Female



Demographics





                          Address                   [address]

[place]  [address]

 

                          Home Phone                [phone]

 

                          Preferred Language        Unknown

 

                          Marital Status            Unknown

 

                          Lutheran Affiliation     Unknown

 

                          Race                      White

 

                          Ethnic Group              Unknown





Author





                          Author                    [facility]

ems

 

                          Organization              [facility]

ems

 

                          Address                   Unknown

 

                          Phone                     Unavailable







Support





                Name            Relationship    Address         Phone

 

                    [place] GUAR                [address]

[place], [place]  [address] [phone]

 

                    miguellovejossue   ECON                [address] [place]  [address]               [phone]







Care Team Providers





                    Care Team Member Name Role                Phone

 

                    Queta Stanley Unavailable         [phone]







PROBLEMS





          Type      Condition ICD9-CM Code WBS32-EV Code Onset Dates Condition S

tatus W/U 

Status              Risk                SNOMED Code         Notes

 

       Problem Allergic rhinitis        J30.9         Active confirmed        61

[id number]  

 

       Problem Sinusitis        J32.9         Active confirmed        [id number]  

 

       Problem Other chronic pain        G89.29        Active confirmed        8

[id number]  

 

       Problem Dyspareunia in female        N94.10        Active confirmed      

  [id number]  







ALLERGIES

No Known Allergies



ENCOUNTERS from 2000 to 2021





             Encounter    Location     Date         Provider     Diagnosis

 

                [address] [address]     Queta Stanley                               Chest congestion R09.89 and Sinusitis J3

2.9







IMMUNIZATIONS





                Vaccine         Route           Administration Date Status

 

                Influenza (6mo & up) Fluzone Unknown         Aug 29, 2017    Oth

ers







SOCIAL HISTORY

Tobacco Use:



                    Social History Observation Description         Date

 

                    Details (start date - stop date) Never Smoker         



Sex Assigned At Birth:



                          Social History Observation Description

 

                          Sex Assigned At Birth     Unknown



Education:



                    Question            Answer              Notes

 

                    Level of Education: Not Finished College  



Language:



                    Question            Answer              Notes

 

                    Languages spoken:   English              



Church:



                    Question            Answer              Notes

 

                    Church            [address]         



Sexual Hx:



                    Question            Answer              Notes

 

                    Had sex in the last 12 months (vaginal, oral, or anal)? No  

                 

 

                    LMP:                2020              



Alcohol Screening:



                    Question            Answer              Notes

 

                    Did you have a drink containing alcohol in the past year? Ye

s                  

 

                    Points              1                    

 

                    Interpretation      Negative             

 

                                        How many drinks did you have on a typica

l day when you were drinking in the past

year?                     1 or 2 (0 points)          

 

                          How often did you have a drink containing alcohol in t

he past year? Monthly or 

less (1 point)                           



Tobacco Use:



                    Question            Answer              Notes

 

                    Are you a:          never smoker         







REASON FOR REFERRAL

No Information



VITAL SIGNS





                    Weight              165 lbs             

 

                    Height              65 in               

 

                    BMI                 27.45 kg/m2         

 

                    Heart Rate          94 /min             

 

                    Respiratory Rate    16 /min             

 

                    Temperature         98.8 degrees Fahrenheit 

 

                    Oximetry            99                  

 

                    Blood pressure systolic 132 mm Hg           

 

                    Blood pressure diastolic 74 mm Hg            







MEDICATIONS





           Medication SIG (Take, Route, Frequency, Duration) Notes      Start Da

te End Date   

Status

 

           Allegra-D 24 Hour                                             Not-Omid

ing

 

                Flonase Allergy Relief 50 MCG/ACT 1 spray in each nostril Nasall

y Once a day                  

                                                    Active

 

           Zyrtec Allergy 10 MG 1 tablet Orally Once a day for 30 day(s)        

                          Active

 

           Larissia 0.1-20 MG-MCG 1 tablet Orally Once a day for 28 day(s)      

                            Not-Taking

 

                          Amoxicillin-Pot Clavulanate 875-125 MG 1 tablet Orally

 every 12 hrs for 10 

day(s)                                              Not-Taking

 

           GIAN 3-0.02 MG 1 tablet Orally Once a day for 28 day(s)                        Active







PROCEDURES

No Information



RESULTS

No Results



REASON FOR VISIT

sinus



MEDICAL (GENERAL) HISTORY





                    Type                Description         Date

 

                    Medical History     seasonal allergies   

 

                    Medical History     anxiety              

 

                    Surgical History    3 tympanic surgeries on left ear  







Goals Section

No Information



Health Concerns

No Information



MEDICAL EQUIPMENT

No Information



MENTAL STATUS

No Information



FUNCTIONAL STATUS

No Information



ASSESSMENTS





             Encounter Date Diagnosis    Assessment Notes Treatment Notes Treatm

ent Clinical 

Notes

 

                    Chest congestion (ICD-10 - R09.89)              

   



                                        



obtain chest x-ray 



rapid Covid test to risk stratify.

 

                    Sinusitis (ICD-10 - J32.9)                 



                                        











PLAN OF TREATMENT

Medication



                Medication Name Sig             Start Date      Stop Date

 

                GIAN 3-0.02 MG   1 tablet Orally Once a day for 28 day(s)      



Treatment Notes



                    Assessment          Notes               Clinical Notes

 

                    Chest congestion                        obtain chest x-rayra

pid Covid test to risk stratify.



Treatment Notes



                          Test Name                 Order Date

 

                          Chest X-ray PA and lateral 2021



Future Test



                          Test Name                 Order Date

 

                          [facility] COVID AG (Point of Care) [id number]



Next Appt



                                        Details

 

                                        AS Reason:

 

                                        Provider Name:Lucía Arboleda, 2021

 03:00:00 PM, [address] [place], [address], [phone]

 

                                        Provider Name:Queta Jaden,  11:00:00 AM, [address] [place], [address], [phone]







Insurance Providers





             Payer Name   Payer Address Payer Phone  Insured Name Patient Relati

onship to 

Insured                   Coverage Start Date       Coverage End Date

 

                    [employer] [facility] [phone] [facility] [address] 

[phone]    [place] self

## 2021-02-26 NOTE — ED PDOC
Post-Departure Follow-Up


1st trimester [facility] Lundborg-Gray,Maja MD          Feb 26, 2021 12:42

## 2021-02-26 NOTE — CCD
Summarization of Episode Note

                             Created on: 2021



DREADFLORIDA ESTEE

External Reference #: [id number]

: 2000

Sex: Female



Demographics





                          Address                   [address]

[place]  [address]

 

                          Home Phone                [phone]

 

                          Preferred Language        Unknown

 

                          Marital Status            Unknown

 

                          Taoist Affiliation     Unknown

 

                          Race                      White

 

                          Ethnic Group              Unknown





Author





                          Author                    [facility]

ems

 

                          Organization              [facility]

ems

 

                          Address                   Unknown

 

                          Phone                     Unavailable







Support





                Name            Relationship    Address         Phone

 

                    FL[place] CANADA GUAR                [address]

[place], [place]  [address] [phone]

 

                    miguellovejossue   ECON                [address] [place]  [address]               [phone]







Care Team Providers





                    Care Team Member Name Role                Phone

 

                    Lilly Stone     Unavailable         [phone]







PROBLEMS





          Type      Condition ICD9-CM Code FZI43-QO Code Onset Dates Condition S

tatus W/U 

Status              Risk                SNOMED Code         Notes

 

       Problem Allergic rhinitis        J30.9         Active confirmed        61

[id number]  

 

       Problem Sinusitis        J32.9         Active confirmed        [id number]  

 

       Problem Other chronic pain        G89.29        Active confirmed        8

[id number]  

 

       Problem Dyspareunia in female        N94.10        Active confirmed      

  [id number]  







ALLERGIES

No Known Allergies



ENCOUNTERS from 1998 to 2021-02-10





             Encounter    Location     Date         Provider     Diagnosis

 

                          [facility] [address] [place] [address] 

[address]                  Lilly Stone       Encounter for gyneco

logical examination 

(general) (routine) without abnormal findings Z01.419 ; Encounter for other 
screening for malignant neoplasm of breast Z12.39 ; Encounter for screening for 
malignant neoplasm of cervix Z12.4 ; Encounter for screening for infections with
a predominantly sexual mode of transmission Z11.3 ; Yeast vaginitis B37.3 and 
Encounter for initial prescription of contraceptive pills Z30.011







IMMUNIZATIONS





                Vaccine         Route           Administration Date Status

 

                Influenza (6mo & up) Fluzone Unknown         Aug 29, 2017    Oth

ers







SOCIAL HISTORY

Tobacco Use:



                    Social History Observation Description         Date

 

                    Details (start date - stop date) Never Smoker         



Sex Assigned At Birth:



                          Social History Observation Description

 

                          Sex Assigned At Birth     Unknown



Education:



                    Question            Answer              Notes

 

                    Level of Education: Not Finished College  



Language:



                    Question            Answer              Notes

 

                    Languages spoken:   English              



Zoroastrianism:



                    Question            Answer              Notes

 

                    Zoroastrianism            [address] [employer]         



Sexual Hx:



                    Question            Answer              Notes

 

                    Had sex in the last 12 months (vaginal, oral, or anal)? No  

                 

 

                    LMP:                2020              



Alcohol Screening:



                    Question            Answer              Notes

 

                    Did you have a drink containing alcohol in the past year? Ye

s                  

 

                    Points              1                    

 

                    Interpretation      Negative             

 

                                        How many drinks did you have on a typica

l day when you were drinking in the past

year?                     1 or 2 (0 points)          

 

                          How often did you have a drink containing alcohol in t

he past year? Monthly or 

less (1 point)                           



Tobacco Use:



                    Question            Answer              Notes

 

                    Are you a:          never smoker         







REASON FOR REFERRAL

No Information



VITAL SIGNS





                    Weight              164 lbs             

 

                    Weight-kg           74.39 kg            

 

                    Height              65 in               

 

                    BMI                 27.29 kg/m2         

 

                    Blood pressure systolic 120 mm Hg           

 

                    Blood pressure diastolic 79 mm Hg            







MEDICATIONS





           Medication SIG (Take, Route, Frequency, Duration) Notes      Start Da

te End Date   

Status

 

           Allegra-D 24 Hour                                             Not-Omid

ing

 

                Flonase Allergy Relief 50 MCG/ACT 1 spray in each nostril Nasall

y Once a day                  

                                                    Active

 

           Zyrtec Allergy 10 MG 1 tablet Orally Once a day for 30 day(s)        

                          Active

 

           Larissia 0.1-20 MG-MCG 1 tablet Orally Once a day for 28 day(s)      

                            Not-Taking

 

                          Amoxicillin-Pot Clavulanate 875-125 MG 1 tablet Orally

 every 12 hrs for 10 

day(s)                                              Not-Taking

 

           GIAN 3-0.02 MG 1 tablet Orally Once a day for 28 day(s)                        Active







PROCEDURES

No Information



RESULTS

No Results



REASON FOR VISIT

TO GET EST



MEDICAL (GENERAL) HISTORY





                    Type                Description         Date

 

                    Medical History     seasonal allergies   

 

                    Medical History     anxiety              

 

                    Surgical History    3 tympanic surgeries on left ear  







Goals Section

No Information



Health Concerns

No Information



MEDICAL EQUIPMENT

No Information



MENTAL STATUS

No Information



FUNCTIONAL STATUS

No Information



ASSESSMENTS





             Encounter Date Diagnosis    Assessment Notes Treatment Notes Treatm

ent Clinical 

Notes

 

                                        Encounter for gynecological 

examination (general) (routine) without

abnormal findings (ICD-10 - Z01.419)                           



                                        





 

                                        Encounter for other screenin

g for malignant neoplasm of breast 

(ICD-10 - Z12.39)                                   



                                        





 

                                        Encounter for screening for 

malignant neoplasm of cervix (ICD-10 - 

Z12.4)                                              



                                        





 

                                        Encounter for screening for 

infections with a predominantly sexual 

mode of transmission (ICD-10 - Z11.3)                           



                                        





 

                    Yeast vaginitis (ICD-10 - B37.3)                

 



                                        



HAs monistat at home to use

 

                                        Encounter for initial prescr

iption of contraceptive pills (ICD-10 -

Z30.011)                                            



                                        



Start OCP now and use condom as backup every time until IUD inserted.Side 
effects and usage reviewed

 

              Other                                  Desires IUD ,pr

efers paraguard ,info given on Kyleena and

paraguard.







PLAN OF TREATMENT

Medication



                Medication Name Sig             Start Date      Stop Date

 

                GIAN 3-0.02 MG   1 tablet Orally Once a day for 28 day(s)      



Treatment Notes



                    Assessment          Notes               Clinical Notes

 

                    Yeast vaginitis                         HAs monistat at home

 to use

 

                    Encounter for initial prescription of contraceptive pills   

                  Start OCP now and 

use condom as backup every time until IUD inserted.Side effects and usage 
reviewed



Treatment Notes



                          Test Name                 Order Date

 

                          PAP REQUEST FOR SERVICE   2021

 

                          CHLAMYDIA, GC & TRICH AMP 2021



Next Appt



                                        Details

 

                                        IUD Reason:

 

                                        Provider Name:Lucía Arboleda, 2021

 03:00:00 PM, [address] [place], [address], [phone]

 

                                        Provider Name:Queta Stanley,  11:00:00 AM, [address] [place], [address], [phone]







Insurance Providers





             Payer Name   Payer Address Payer Phone  Insured Name Patient Relati

onship to 

Insured                   Coverage Start Date       Coverage End Date

 

                    [employer] [facility] [phone] [facility] [address] 

[phone]    [place] self

## 2021-02-26 NOTE — REPVR
PROCEDURE INFORMATION: 

Exam: US Pregnancy First Trimester, Transabdominal 

Exam date and time: 2021 3:34 AM 

Age: 20 years old 

Clinical indication: Lmp or gestational age (in weeks): 2021; Other: 

Vaginal bleeding; Pregnant; Patient HX: [facility] and was given  meds to 

speed up process for per patient, patient states took 2nd pill around 2pm and 

severe bleeding and clotting started around 9pm 



TECHNIQUE: 

Imaging protocol: Real-time transabdominal obstetrical ultrasound of the 

maternal pelvis and a first trimester pregnancy, less than 14 weeks 0 days, 

with image documentation. 



COMPARISON: 

No relevant prior studies available. 



FINDINGS: 



GESTATION: 

Gestation: There is no intrauterine gestational sac. 

Embryonic/fetal heart rate: Sought but not present. 

Amniotic fluid: Sought but not present. 



MATERNAL: 

Uterus: The uterus is anteverted and measures 10.6 x 4.3 x 4.7 cm. The 

endometrium appears irregular but measures within normal thickness at 7 mm. 

There is hyperemia of the endometrium on color Doppler imaging. There is 

hypoechoic fluid in the endometrial canal. 

Cervix: There is fluid in the endocervical canal appear 

Right adnexa: The right ovary appears unremarkable and measures 1.4 x 2.9 x 1.4 

cm. Normal blood flow is seen on color and pulsed Doppler imaging. 

Left adnexa: The left ovary appears unremarkable and measures 3.0 x 2.7 x 1.6 

is. Normal blood flow is seen on color and pulsed Doppler imaging. 

Intraperitoneal space: There is no fluid in the cul-de-sac. 



IMPRESSION: 

1. No gestational sac identified. According to history given by the patient, a 

prior ultrasound had shown a gestational sac and that  pills have been 

taken. 

2. Complex fluid consistent with blood products in the endometrial canal and 

endocervical canal. The endometrium appears irregular and hyperemic but not 

significantly thickened. 



Electronically signed by: Lucía Williamson On 2021  04:32:11 AM

## 2021-02-26 NOTE — CCD
Summarization of Episode Note

                             Created on: 2021



[facility] EST[facility]

External Reference #: [id number]

: 2000

Sex: Female



Demographics





                          Address                   [address]

[place]  [address]

 

                          Home Phone                [phone]

 

                          Preferred Language        Unknown

 

                          Marital Status            Unknown

 

                          Roman Catholic Affiliation     Unknown

 

                          Race                      White

 

                          Ethnic Group              Unknown





Author





                          Author                    [facility]

ems

 

                          Organization              [facility]

ems

 

                          Address                   Unknown

 

                          Phone                     Unavailable







Support





                Name            Relationship    Address         Phone

 

                    [place] GUAR                [address]

[place]  [address] [phone]

 

                    [email or web address]   [employer]                [address] [place]  [address]               [phone]







Care Team Providers





                    Care Team Member Name Role                Phone

 

                    Queta Stanley Unavailable         [phone]







PROBLEMS





          Type      Condition ICD9-CM Code SWF35-VW Code Onset Dates Condition S

tatus W/U 

Status              Risk                SNOMED Code         Notes

 

       Problem Allergic rhinitis        J30.9         Active confirmed        61

[id number]  

 

       Problem Sinusitis        J32.9         Active confirmed        [id number]  

 

       Problem Other chronic pain        G89.29        Active confirmed        8

[id number]  

 

       Problem Dyspareunia in female        N94.10        Active confirmed      

  [id number]  







ALLERGIES

No Known Allergies



ENCOUNTERS from 1998 to 2021





             Encounter    Location     Date         Provider     Diagnosis

 

                [address] [address]     Queta Huffmanmargaret                                







IMMUNIZATIONS





                Vaccine         Route           Administration Date Status

 

                Influenza (6mo & up) Fluzone Unknown         Aug 29, 2017    Oth

ers







SOCIAL HISTORY

Tobacco Use:



                    Social History Observation Description         Date

 

                    Details (start date - stop date) Never Smoker         



Sex Assigned At Birth:



                          Social History Observation Description

 

                          Sex Assigned At Birth     Unknown



Education:



                    Question            Answer              Notes

 

                    Level of Education: Not Finished College  



Language:



                    Question            Answer              Notes

 

                    Languages spoken:   English              



Latter-day:



                    Question            Answer              Notes

 

                    L[employer]-day            [employer]         



Sexual Hx:



                    Question            Answer              Notes

 

                    Had sex in the last 12 months (vaginal, oral, or anal)? No  

                 

 

                    LMP:                2020              



Alcohol Screening:



                    Question            Answer              Notes

 

                    Did you have a drink containing alcohol in the past year? Ye

s                  

 

                    Points              1                    

 

                    Interpretation      Negative             

 

                                        How many drinks did you have on a typica

l day when you were drinking in the past

year?                     1 or 2 (0 points)          

 

                          How often did you have a drink containing alcohol in t

he past year? Monthly or 

less (1 point)                           



Tobacco Use:



                    Question            Answer              Notes

 

                    Are you a:          current every day smoker 1/2 ppd

 

                    Are you a:          never smoker         







REASON FOR REFERRAL

No Information



VITAL SIGNS

No information



MEDICATIONS





           Medication SIG (Take, Route, Frequency, Duration) Notes      Start Da

te End Date   

Status

 

                          Amoxicillin-Pot Clavulanate 875-125 MG 1 tablet Orally

 every 12 hrs for 10 

day(s)                                              Active

 

           Larissia 0.1-20 MG-MCG 1 tablet Orally Once a day for 28 day(s)      

                            Not-Taking

 

           Allegra-D 24 Hour                                             Active







PROCEDURES

No Information



RESULTS

No Results



REASON FOR VISIT

sinus



MEDICAL (GENERAL) HISTORY





                    Type                Description         Date

 

                    Medical History     seasonal allergies   

 

                    Surgical History    3 tympanic surgeries on left ear  







Goals Section

No Information



Health Concerns

No Information



MEDICAL EQUIPMENT

No Information



MENTAL STATUS

No Information



FUNCTIONAL STATUS

No Information



ASSESSMENTS

No Information



PLAN OF TREATMENT

Medication



                Medication Name Sig             Start Date      Stop Date

 

                          Amoxicillin-Pot Clavulanate 875-125 MG 1 tablet Orally

 every 12 hrs for 10 

day(s)                                   



Next Appt



                                        Details

 

                                        Provider Name:Lilyl Stone, 2021 

10:00:00 AM, [address] [place] [address], [id number], [phone]

 

                                        Provider Name:Queta Stanley,  11:00:00 AM, [address] [place] [address], [phone], [phone]







Insurance Providers





             Payer Name   Payer Address Payer Phone  Insured Name Patient Relati

onship to 

Insured                   Coverage Start Date       Coverage End Date

 

                    [employer] [facility] [phone] [facility] [address] 

[phone]    [place] self

## 2021-02-26 NOTE — CCD
Summarization Of Episode

                             Created on: 2021



FLORIDA ROTHMAN

External Reference #: [id number]

: 2000

Sex: Female



Demographics





                          Address                   [address] [place]  [address]

 

                          Home Phone                [phone]

 

                          Preferred Language        English

 

                          Marital Status            

 

                          Presybeterian Affiliation     NONE

 

                          Race                      White

 

                          Ethnic Group              Not  or 





Author





                          Author                    [facility]

 

                          Organization              [facility]

 

                          Address                   Unknown

 

                          Phone                     Unavailable







Support





                Name            Relationship    Address         Phone

 

                    JOSSUE ROTHMAN  Next Of Kin         [address] [place]  [address]               [phone]

 

                    MILAGROS PELLETIER     Next Of Kin         [address] [place] [address]                     [phone]

 

                    GUILFOYLE           Next Of Kin         [address] [place] [address]                    [phone]

 

                    MILAGROS WHITNEY     Next Of Kin         [address] [place] [address]                     [phone]

 

                    [employer]      Next Of Kin         [address] [place] [address]                    [phone]

 

                UE              Next Of Kin     Unknown         Unavailable

 

                    TEODORO DEL VALLE    Next Of Kin         [address] [place] [address]                    [phone]

 

                    jossue rothman  ECON                [address] [place]  [address]               Unavailable

 

                    MILAGROS WHITNEY     ECON                [address] [address]  [address]                     Unavailable







Care Team Providers





                    Care Team Member Name Role                Phone

 

                    JOHAN CATES    Unavailable         Unavailable

 

                    JOHAN CATES    Unavailable         Unavailable

 

                    DESJARLAIS,  RANJAN NP Unavailable         Unavailable

 

                    DESJARLAIS,  RANJAN NP Unavailable         Unavailable

 

                    DESJARLAIS,  RANJAN NP Unavailable         Unavailable

 

                    DESJARLAIS,  RANJAN NP Unavailable         Unavailable

 

                    DESJARLAIS,  RANJAN NP Unavailable         Unavailable

 

                    DESJARLAIS,  RANJAN NP Unavailable         Unavailable

 

                    DESJARLAIS,  RANJAN NP Unavailable         Unavailable

 

                    DESJARLAIS,  RANJAN NP Unavailable         Unavailable

 

                    DESJARLAIS,  RANJAN NP Unavailable         Unavailable

 

                    LETTIERE, A SAMANTHA PA Unavailable         Unavailable

 

                    LETTIERE, A SAMANTHA PA Unavailable         Unavailable

 

                    LETTIERE, A SAMANTHA PA Unavailable         Unavailable

 

                    LETTIERE, A SAMANTHA PA Unavailable         Unavailable

 

                    LETTIERE, A SAMANTHA PA Unavailable         Unavailable

 

                    LETTIERE, A SAMANTHA PA Unavailable         Unavailable

 

                    LETTIERE, A SAMANTHA PA Unavailable         Unavailable

 

                    LETTIERE, A SAMANTHA PA Unavailable         Unavailable

 

                    LETTIERE, A SAMANTHA PA Unavailable         Unavailable

 

                    LETTIERE, A SAMANTHA PA Unavailable         Unavailable

 

                    LETTIERE, A SAMANTHA PA Unavailable         Unavailable

 

                    LETTIERE, A SAMANTHA PA Unavailable         Unavailable

 

                    LETTIERE, A SAMANTHA PA Unavailable         Unavailable

 

                    LETTIERE, A SAMANTHA PA Unavailable         Unavailable

 

                    LETTIERE, A SAMANTHA PA Unavailable         Unavailable

 

                    LETTIERE, A SAMANTHA PA Unavailable         Unavailable

 

                    LETTIERE, A SAMANTHA PA Unavailable         Unavailable

 

                    LETTIERE, A SAMANTHA PA Unavailable         Unavailable

 

                    LETTIERE, A SAMANTHA PA Unavailable         Unavailable

 

                    LETTIERE, A SAMANTHA PA Unavailable         Unavailable

 

                    LETTIERE, A SAMANTHA PA Unavailable         Unavailable

 

                    LETTIERE, A SAMANTHA PA Unavailable         Unavailable

 

                    LETTIERE, A SAMANTHA PA Unavailable         Unavailable

 

                    LETTIERE, A SAMANTHA PA Unavailable         Unavailable

 

                    LETTIERE, A SAMANTHA PA Unavailable         Unavailable

 

                    LETTIERE, A SAMANTHA PA Unavailable         Unavailable

 

                    LETTIERE, A SAMANTHA PA Unavailable         Unavailable

 

                    LETTIERE, A SAMANTHA PA Unavailable         Unavailable

 

                    LETTIERE, A SAMANTHA PA Unavailable         Unavailable



                                  



Re-disclosure Warning

          The records that you are about to access may contain information from 
federally-assisted alcohol or drug abuse programs. If such information is 
present, then the following federally mandated warning applies: This information
has been disclosed to you from records protected by federal confidentiality 
rules (42 CFR part 2). The federal rules prohibit you from making any further 
disclosure of this information unless further disclosure is expressly permitted 
by the written consent of the person to whom it pertains or as otherwise 
permitted by 42 CFR part 2. A general authorization for the release of medical 
or other information is NOT sufficient for this purpose. The Federal rules 
restrict any use of the information to criminally investigate or prosecute any 
alcohol or drug abuse patient.The records that you are about to access may 
contain highly sensitive health information, the redisclosure of which is 
protected by Article 27-F of the [facility] Public Health law. If you 
continue you may have access to information: Regarding HIV / AIDS; Provided by 
facilities licensed or operated by the [facility] Office of Mental Health; 
or Provided by the [facility] Office for People With Developmental 
Disabilities. If such information is present, then the following [place] 
mandated warning applies: This information has been disclosed to you from 
confidential records which are protected by state law. State law prohibits you 
from making any further disclosure of this information without the specific 
written consent of the person to whom it pertains, or as otherwise permitted by 
law. Any unauthorized further disclosure in violation of state law may result in
a fine or residential sentence or both. A general authorization for the release of 
medical or other information is NOT sufficient authorization for further disc
losure.                                                                         
    



Family History

          



             Family Member Name Family Member Gender Family Member Status Date o

f Status 

Description                             Data Source(s)

 

           Unknown    Unknown    Problem                          [facility] ([facility] )



                                                                                
       



Encounters

          



           Encounter  Providers  Location   Date       Indications Data Source(s

)

 

                [place]                      [facility] [facility]

[place] [address] 2021 12:00:00 AM

EST                                                 eCW1 (On license of [facility])

 

                [place]                      [facility] [facility]

[place] [address] 2021 12:00:00 AM

EST                                                 eCW1 (On license of [facility])

 

                Unknown                         [address] [facility]

[place] [address] 2021 12:00:00 AM 

EST                                                 eCW1 (On license of [facility])

 

                Outpatient                      [address] [facility] [id number] 2021 12:00:00 AM

EST                                                 eCW1 (On license of [facility])

 

                Outpatient      Attender: SAMANTHA loera 08/10/2020 

09:40:00 AM [facility]

, [facility])

 

           Outpatient Attender: RANJAN PADILLA NP            2020 01:

00:00 PM [facility]

 

           Outpatient Attender: RANJAN PADILLA NP            2020 09:

02:00 AM [facility]

 

           Outpatient Attender: JOSSUE CATES            2020 02:00:00 PM 

[facility]

 

           Outpatient Attender: JOSSUE CATES            05/15/2020 09:00:00 AM 

[facility]

 

           Outpatient Attender: RANJAN PADILLA NP            2020 11:

40:00 AM [facility]

 

           Outpatient Attender: JOSSUE CATES            2020 09:01:00 AM 

[facility]

 

           Outpatient Attender: JOSSUE CATES            2020 09:41:00 AM 

[facility]

 

           Outpatient Attender: RANJAN PADILLA NP            2020 11:

40:00 AM [facility]

 

           Outpatient Attender: RANJAN PADILLA NP            2020 11:

39:00 AM [facility]

 

           Outpatient Attender: JOSSUE CATES            2020 12:44:00 PM 

[facility]

 

           Outpatient Attender: JOSSUE CATES            2020 12:43:00 PM 

[facility]

 

           Outpatient Attender: JOSSUE CATES            2020 09:57:00 AM 

[facility]

 

           Outpatient Attender: RANJAN PADILLA NP            2019 02:

31:00 PM [facility]

 

           Outpatient Attender: RANJAN PADILLA NP            2019 09:

06:00 AM [facility]

 

           Outpatient Attender: RANJAN PADILLA NP            2019 12:

47:00 PM [facility]



                                                                                
                                                                                
                                                                                
                                   



Medications

          



          Medication Brand Name Start Date Product Form Dose      Route     Admi

nistrative 

Instructions Pharmacy Instructions Status     Indications Reaction   Description

 Data 

Source(s)

 

        GIAN 3-0.02 MG GIAN 3-0.02 MG 2021 12:00:00 AM EST         1.0 {tabl

et}                         

active                                          GIAN 3-0.02 MG   eCW1 ([facility])

 

        GIAN 3-0.02 MG GIAN 3-0.02 MG 2021 12:00:00 AM EST         1.0 {tabl

et}                         

active                                          GIAN 3-0.02 MG   eCW1 ([facility])

 

                                        Amoxicillin 875 MG / Clavulanate 125 MG 

Oral Tablet Amoxicillin-Pot Clavulanate 

875-125 MG      Amoxicillin-Pot Clavulanate 875-125 MG 2021 12:00:00 AM ES

T                 

1.0 {tablet}                         suspended                 Amoxicillin-Pot C

lavulanate 875-125 MG eCW1 

([facility])

 

                                        Amoxicillin 875 MG / Clavulanate 125 MG 

Oral Tablet Amoxicillin-Pot Clavulanate 

875-125 MG      Amoxicillin-Pot Clavulanate 875-125 MG 2021 12:00:00 AM ES

T                 

1.0 {tablet}                         suspended                 Amoxicillin-Pot C

lavulanate 875-125 MG eCW1 

([facility])

 

                                        Amoxicillin 875 MG / Clavulanate 125 MG 

Oral Tablet Amoxicillin-Pot Clavulanate 

875-125 MG      Amoxicillin-Pot Clavulanate 875-125 MG 2021 12:00:00 AM ES

T                 

1.0 {tablet}                         active                  Amoxicillin-Pot Cla

vulanate 875-125 MG eCW1 

([facility])

 

     Birth Control Pill      08/10/2020 12:00:00 AM EDT                         

 active                [facility] 

([facility])



                                                                                
                                     



Insurance Providers

          



             Payer name   Policy type / Coverage type Policy ID    Covered party

 ID Covered 

party's relationship to macias Policy Macias             Plan Information

 

          [employer] [place] 306/806           [id number]           SP        

          [id number]

 

          SELF PAY ONLY           [id number]           SP                  [id number]

[id number]

 

          [employer] B         [id number]           S                   [id number]

[id number]

 

           [place]           [id number]           SPO             

    [id number]

 

          [employer]  [place]         [id number]           O               

    [id number]

 

           [facility] HUMANA            [id number]           [id number]       

         [id number]

 

             U         [id number]           Self                [id number]

 

            [employer] [place] [employer]OUGLAS O         [id number]           S              

     [id number]

 

            [facility]           [id number]           [id number]         

       [id number]

 

            [facility]           [id number]           HU2          

       [id number]

 

             [facility] Health Maintenance Organization (HMO) [id number]

[id number]                 

Family Dependent                                    [id number]

 

            [facility]           [id number]           [id number]          

       [id number]

 

          [facility]           [id number]           SP                  [id number]

[id number]



                                                                                
                                                                                
                                              



Problems, Conditions, and Diagnoses

          



           Code       Display Name Description Problem Type Effective Dates Data

 Source(s)

 

           J32.9      Sinusitis  Sinusitis  Problem    2021 12:00:00 AM ES

T eCW1 ([facility])

 

           J30.9      Allergic rhinitis Allergic rhinitis Problem    2021 

12:00:00 AM EST 

eCW1 ([facility])

 

             G47.00       Insomnia, unspecified INSOMNIA, UNSPECIFIED Diagnosis 

   2020 01:00:00

PM [facility]

 

             F60.3        Borderline personality disorder BORDERLINE PERSONALITY

 DISORDER Diagnosis    

2020 01:00:00 PM [facility]

 

             F41.1        Generalized anxiety disorder GENERALIZED ANXIETY DISOR

MERRY Diagnosis    

2020 01:00:00 PM [facility]

 

                F60.9           Personality disorder, unspecified PERSONALITY DI

SORDER, UNSPECIFIED 

Diagnosis                 2020 09:02:00 AM [facility]

 

                    F41.0               Panic disorder [episodic paroxysmal anxi

ety] PANIC DISORDER [EPISODIC 

PAROXYSMAL ANXIETY] Diagnosis           2020 09:02:00 AM [facility]

l

 

                    F33.2               Major depressive disorder, recurrent sev

ere without psychotic features 

MAJOR DEPRESSV DISORDER, RECURRENT SEVERE W/O PSYC Diagnosis                 2020 02:00:00

PM [facility]



                                                                                
                                                                                
      



Surgeries/Procedures

          



             Procedure    Description  Date         Indications  Data Source(s)

 

                    Therapeutic, Prophylactic Or Diagnostic Injection Subq/Im   

                  08/10/2020 12:00:00 

AM EDT                                              [facility] ([facility]



                                                                                
       



Results

          



                    ID                  Date                Data Source

 

                    CBC with Differential 2021 12:00:00 AM EST eCW1 ([facility])









          Name      Value     Range     Interpretation Code Description Data Marialuisa

rce(s) Supporting 

Document(s)

 

                    4.45      4.00-5.40                     eCW1 ([facility])  

 

                    7.6       4.0-10.0                      eCW1 ([facility])  

 

                    13.6      12.0-15.5                     eCW1 ([facility])  

 

                    42.7      36.0-47.0                     eCW1 ([facility])  

 

                    31.9      32.0-36.5                     eCW1 ([facility])  

 

                    96.0      80.0-96.0                     eCW1 ([facility])  

 

                    30.6      27.0-33.0                     eCW1 ([facility])  

 

                    6.7       0.0-5.0                       eCW1 ([facility])  

 

                    293       150-450                       eCW1 ([facility])  

 

                    12.3      11.5-14.5                     eCW1 ([facility])  

 

                    46.7      36.0-66.0                     eCW1 ([facility])  

 

                    33.9      24.0-44.0                     eCW1 ([facility])  

 

                    0.5       0.0-0.8                       eCW1 ([facility])  

 

                    0.5       0.0-1.0                       eCW1 ([facility])  

 

                    3.6       1.5-8.5                       eCW1 ([facility])  

 

                    12.1      0.0-3.0                       eCW1 ([facility])  

 

                    2.6       1.5-5.0                       eCW1 ([facility])  

 

                    0.9       0.0-0.5                       eCW1 ([facility])  

 

                    0.0       0.0-0.2                       eCW1 ([facility])  









                    ID                  Date                Data Source

 

                    Comprehensive Metabolic Profile (CMP) 2021 12:00:00 AM

 EST eCW1 ([facility])









          Name      Value     Range     Interpretation Code Description Data Marialuisa

rce(s) Supporting 

Document(s)

 

                    9         7-18                          eCW1 ([facility])  

 

                    0.74      0.55-1.30                     eCW1 ([facility])  

 

                    80                                eCW1 ([facility])  

 

                    139       136-145                       eCW1 ([facility])  

 

                    106                               eCW1 ([facility])  

 

                    > 60.0    >60                           eCW1 ([facility])  

 

                    4.5       3.5-5.1                       eCW1 ([facility])  

 

                    29        21-32                         eCW1 ([facility])  

 

                    27        12-78                         eCW1 ([facility])  

 

                    74                                eCW1 ([facility])  

 

                    9.0       8.5-10.1                      eCW1 ([facility])  

 

                    0.4       0.2-1.0                       eCW1 ([facility])  

 

                    23        7-37                          eCW1 ([facility])  

 

                    7.0       6.4-8.2                       eCW1 ([facility])  

 

                    3.7       3.2-5.2                       eCW1 ([facility])  

 

                    1.1       1.2-2.2                       eCW1 ([facility])  









                    ID                  Date                Data Source

 

                    FREE T4 & TSH PANEL 2021 12:00:00 AM EST eCW1 ([facility])









          Name      Value     Range     Interpretation Code Description Data Marialuisa

rce(s) Supporting 

Document(s)

 

                    1.480     0.358-3.740                     eCW1 ([facility])  

 

                    0.99      0.76-1.46                     eCW1 ([facility])  









                    ID                  Date                Data Source

 

                    LIPID PANEL (CARDIAC RISK) 2021 12:00:00 AM EST eCW1 (

[facility])









          Name      Value     Range     Interpretation Code Description Data Marialuisa

rce(s) Supporting 

Document(s)

 

           Cholesterol in HDL [Moles/volume] in Serum or Plasma 64         >40  

                            eCW1 ([facility])                    

 

           Triglyceride [Mass/volume] in Serum or Plasma by calculation 143     

   <150                             eCW1 

([facility])         

 

           Cholesterol [Moles/volume] in Serum or Plasma 227        <200        

                     eCW1 ([facility])                    

 

                    3.546     <5                            eCW1 ([facility])  

 

           Cholesterol in LDL [Mass/volume] in Serum or Plasma by calculation 13

4        <100                             

eCW1 ([facility])    

 

                    163                                     eCW1 ([facility])  









                    ID                  Date                Data Source

 

                    [id number]          08/10/2020 12:00:00 AM EDT [facility]

 

                                        

________________________________________________________________________________

NEHEMIAH Albercht         Patient Name: ABRAM KANGY45Leighton Marino [employer]   
              [facility] 2000[facility] [place]  [address]              Date of
 Exam: 08/10/2020PH#: [phone]: 
[phone]______________________________________________________________________

__________EXAM: LUMBSACRAL SPINE (2 OR 3 VIEWS) XRAYCLINICAL INFORMATION:  Back 
pain.Five views.Multiple views of the lumbosacral spine show no acute 
fracture,dislocation, or subluxation.  The intervertebral disc spaces are 
symmetricand well maintained.  There is no spondylolysis or spondylolisthesis.  
Thepedicles are intact bilaterally and there is no destructive osseous lesion. 
IMPRESSION:Essentially unremarkable lumbosacral spine series.PJ Ayoub/Pratik you for referring FLORIDA KANG to our office.          
Electronically Signed - YANY CLEMONS DO  20 14:23   









          Name      Value     Range     Interpretation Code Description Data Marialuisa

rce(s) Supporting 

Document(s)

 

                                                                       









                    ID                  Date                Data Source

 

                    [id number]          08/10/2020 12:00:00 AM EDT [facility]

 

                                        

________________________________________________________________________________

NEHEMIAH Albrecht         Patient Name: ABRAM [id number]aweek   
              YOB: 2000[place]  [address]              Date of
 Exam: 08/10/2020PH#: [phone]: 
[phone]______________________________________________________________________

__________EXAM: KNEE RIGHT (COMPLETE)  X-RAYCLINICAL INFORMATION: Pain.Five 
views.The compartments are symmetric and well maintained.  There is no 
acutefracture or destructive osseous lesion.Yany Clemons, PJ/Ladarius you
 for referring FLORIDA KANG to our office.          Electronically Signed -
 YANY CLEMONS DO  20 14:23   









          Name      Value     Range     Interpretation Code Description Data Marialuisa

rce(s) Supporting 

Document(s)

 

                                                                       









                    ID                  Date                Data Source

 

                    [id number]           2020 12:00:00 AM EDT [facility]









          Name      Value     Range     Interpretation Code Description Data Marialuisa

rce(s) Supporting 

Document(s)

 

          2019-nCoV RNA XXX BRAVO+probe-Imp                                       

  [facility]     

 

                                        This lab was ordered by [facility] and reported by 

[employer]. 







                                        Procedure

 

                                          



                                                                                
                                                                              



Social History

          



           Code       Duration   Value      Status     Description Data Source(s

)

 

           Smoking    2021 12:00:00 AM EST Never Smoker completed  Never S

moker eCW1 

([facility])

 

           Smoking    2021 12:00:00 AM EST Never Smoker completed  Never S

moker eCW1 

([facility])

 

           Smoking    2021 12:00:00 AM EST Never Smoker completed  Never S

moker eCW1 

([facility])

 

           Smoking    2021 12:00:00 AM EST Never Smoker completed  Never S

moker eCW1 

([facility])

 

             Smoking      08/10/2020 12:00:00 AM EDT Patient has never smoked co

mpleted    Patient 

has never smoked                        [facility] ([facility])



                                                                                
                                                          



Vital Signs

          



                    ID                  Date                Data Source

 

                    UNK                                      









           Name       Value      Range      Interpretation Code Description Data

 Source(s)

 

           Diastolic blood pressure 79 mm[Hg]                        79 mm[Hg]  

eCW1 ([facility])

 

           Systolic blood pressure 120 mm[Hg]                       120 mm[Hg] e

CW1 ([facility])

 

           Body mass index (BMI) [Ratio] 27.29 kg/m2                       27.29

 kg/m2 W1 ([facility])

 

           Body height 65 [in_i]                        65 [in_i]  eCW1 ([facility])

 

           Body weight 74.39 kg                         74.39 kg   eCW1 ([facility])

 

           Body weight 164 [lb_av]                       164 [lb_av] eCW1 ([facility])

 

           Diastolic blood pressure 74 mm[Hg]                        74 mm[Hg]  

eCW1 ([facility])

 

           Systolic blood pressure 132 mm[Hg]                       132 mm[Hg] e

CW1 ([facility])

 

           Body temperature 98.8 [degF]                       98.8 [degF] eCW1 (

[facility])

 

           Respiratory rate 16 /min                          16 /min    eCW1 ([facility])

 

           Heart rate 94 /min                          94 /min    eCW1 ([facility])

 

           Body mass index (BMI) [Ratio] 27.45 kg/m2                       27.45

 kg/m2 eCW1 ([facility])

 

           Body height 65 [in_i]                        65 [in_i]  eCW1 ([facility])

 

           Body weight 165 [lb_av]                       165 [lb_av] eCW1 ([facility])

 

           Body surface area Derived from formula 1.79 m2                       

   1.79 m2    [facility] ([facility], )

 

           Body weight 71.669 kg                        71.669 kg  [facility] ([facility], )

 

           Ideal body weight 125 [lb_av]                       125 [lb_av] [facility]

T ([facility], )

 

           Body mass index (BMI) [Ratio] 26.3 kg/m2                       26.3 k

g/m2 [facility] ([facility], )

 

           Body weight 158.00 [lb_av]                       158.00 [lb_av] [facility]

T ([facility], )

 

           Body height 65 [in_i]                        65 [in_i]  [facility]

 

                                        5'5" 

 

           Body weight 170 [lb_av]                       170 [lb_av] eCW1 ([facility])

 

           Body height 65 [in_i]                        65 [in_i]  eCW1 ([facility])

 

           Body mass index (BMI) [Ratio] 28.29 kg/m2                       28.29

 kg/m2 W1 ([facility])

 

           Heart rate 88 /min                          88 /min    eCW1 ([facility])

 

           Respiratory rate 18 /min                          18 /min    eCW1 ([facility])

 

           Body temperature 98.4 [degF]                       98.4 [degF] eCW1 (

[facility])

 

           Systolic blood pressure 124 mm[Hg]                       124 mm[Hg] e

CW1 ([facility])

 

           Diastolic blood pressure 78 mm[Hg]                        78 mm[Hg]  

eCW1 ([facility])

 

           Body mass index (BMI) [Ratio] 26.6 kg/m2                       26.6 k

g/m2 [facility] ([facility])

 

           Body height 65 [in_i]                        65 [in_i]  [facility] ([facility])

 

                                        5'5" 

 

           Body weight 160.00 [lb_av]                       160.00 [lb_av] [facility]

T ([facility], 

[facility])

 

           Body temperature 98.5 [degF]                       98.5 [degF] [facility]

 ([facility] 

[facility])

 

           Oxygen saturation in Arterial blood by Pulse oximetry 98 %           

                  98 %       [facility] 

([facility])

 

           Respiratory rate 12 /min                          12 /min    [facility] (

[facility])

 

           Heart rate 76 /min                          76 /min    [facility] ([facility])

 

           Diastolic blood pressure 96 mm[Hg]                        96 mm[Hg]  

[facility] ([facility] 

[facility])

 

           Systolic blood pressure 136 mm[Hg]                       136 mm[Hg] M

[facility] ([facility],

 [facility])

 

           Body surface area Derived from formula 1.78 m2                       

   1.78 m2    [facility] ([facility])

 

           Body weight 70.308 kg                        70.308 kg  [facility] ([facility])

 

           Ideal body weight 125 [lb_av]                       125 [lb_av] [facility]

T ([facility])

 

           Body mass index (BMI) [Ratio] 25.8 kg/m2                       25.8 k

g/m2 [facility] ([facility])

 

           Body weight 155.00 [lb_av]                       155.00 [lb_av] [facility]

T ([facility])

 

           Body height 65 [in_i]                        65 [in_i]  [facility] ([facility])

 

                                        5'5" 



                                                                                
                  



Patient Treatment Plan of Care

          



             Planned Activity Planned Date Details      Description  Data Source

(s)

 

             GIAN 3-0.02 MG 2021 12:00:00 AM EST                           

eCW1 ([facility])

 

             GIAN 3-0.02 MG 2021 12:00:00 AM EST                           

eCW1 ([facility])

 

                    Amoxicillin 875 MG / Clavulanate 125 MG Oral Tablet 20 12:00:00 AM EST  

                                                    eCW1 (On license of [facility])

## 2021-10-14 ENCOUNTER — HOSPITAL ENCOUNTER (OUTPATIENT)
Dept: HOSPITAL 53 - M SFHCCLAY | Age: 21
End: 2021-10-14
Attending: PHYSICIAN ASSISTANT
Payer: COMMERCIAL

## 2021-10-14 DIAGNOSIS — R05.9: Primary | ICD-10-CM
